# Patient Record
Sex: FEMALE | Race: WHITE | NOT HISPANIC OR LATINO | URBAN - METROPOLITAN AREA
[De-identification: names, ages, dates, MRNs, and addresses within clinical notes are randomized per-mention and may not be internally consistent; named-entity substitution may affect disease eponyms.]

---

## 2017-02-13 ENCOUNTER — OUTPATIENT (OUTPATIENT)
Dept: OUTPATIENT SERVICES | Facility: HOSPITAL | Age: 57
LOS: 1 days | End: 2017-02-13

## 2017-02-27 ENCOUNTER — INPATIENT (INPATIENT)
Facility: HOSPITAL | Age: 57
LOS: 2 days | Discharge: HOME HEALTH | End: 2017-03-02
Payer: OTHER MISCELLANEOUS

## 2017-02-27 PROCEDURE — 73560 X-RAY EXAM OF KNEE 1 OR 2: CPT | Mod: 26,LT

## 2017-02-28 ENCOUNTER — OUTPATIENT (OUTPATIENT)
Dept: OUTPATIENT SERVICES | Facility: HOSPITAL | Age: 57
LOS: 1 days | End: 2017-02-28

## 2017-03-01 ENCOUNTER — OUTPATIENT (OUTPATIENT)
Dept: OUTPATIENT SERVICES | Facility: HOSPITAL | Age: 57
LOS: 1 days | End: 2017-03-01

## 2017-03-02 ENCOUNTER — OUTPATIENT (OUTPATIENT)
Dept: OUTPATIENT SERVICES | Facility: HOSPITAL | Age: 57
LOS: 1 days | End: 2017-03-02

## 2023-11-24 ENCOUNTER — APPOINTMENT (OUTPATIENT)
Dept: ULTRASOUND IMAGING | Facility: CLINIC | Age: 63
End: 2023-11-24
Payer: COMMERCIAL

## 2023-11-24 ENCOUNTER — OUTPATIENT (OUTPATIENT)
Dept: OUTPATIENT SERVICES | Facility: HOSPITAL | Age: 63
LOS: 1 days | End: 2023-11-24
Payer: COMMERCIAL

## 2023-11-24 DIAGNOSIS — Z00.8 ENCOUNTER FOR OTHER GENERAL EXAMINATION: ICD-10-CM

## 2023-11-24 PROCEDURE — 76830 TRANSVAGINAL US NON-OB: CPT

## 2023-11-24 PROCEDURE — 76830 TRANSVAGINAL US NON-OB: CPT | Mod: 26

## 2023-11-24 PROCEDURE — 76856 US EXAM PELVIC COMPLETE: CPT

## 2023-11-24 PROCEDURE — 76856 US EXAM PELVIC COMPLETE: CPT | Mod: 26

## 2025-04-10 ENCOUNTER — OUTPATIENT (OUTPATIENT)
Dept: OUTPATIENT SERVICES | Facility: HOSPITAL | Age: 65
LOS: 1 days | Discharge: HOME | End: 2025-04-10
Payer: COMMERCIAL

## 2025-04-10 VITALS
HEART RATE: 77 BPM | TEMPERATURE: 98 F | HEIGHT: 67 IN | RESPIRATION RATE: 15 BRPM | SYSTOLIC BLOOD PRESSURE: 141 MMHG | WEIGHT: 235.89 LBS | OXYGEN SATURATION: 93 % | DIASTOLIC BLOOD PRESSURE: 85 MMHG

## 2025-04-10 DIAGNOSIS — Z96.652 PRESENCE OF LEFT ARTIFICIAL KNEE JOINT: Chronic | ICD-10-CM

## 2025-04-10 DIAGNOSIS — R19.4 CHANGE IN BOWEL HABIT: ICD-10-CM

## 2025-04-10 DIAGNOSIS — K52.9 NONINFECTIVE GASTROENTERITIS AND COLITIS, UNSPECIFIED: ICD-10-CM

## 2025-04-10 DIAGNOSIS — Z96.651 PRESENCE OF RIGHT ARTIFICIAL KNEE JOINT: Chronic | ICD-10-CM

## 2025-04-10 DIAGNOSIS — Z98.890 OTHER SPECIFIED POSTPROCEDURAL STATES: Chronic | ICD-10-CM

## 2025-04-10 PROCEDURE — 88312 SPECIAL STAINS GROUP 1: CPT

## 2025-04-10 PROCEDURE — 88313 SPECIAL STAINS GROUP 2: CPT | Mod: 26

## 2025-04-10 PROCEDURE — 88313 SPECIAL STAINS GROUP 2: CPT

## 2025-04-10 PROCEDURE — 88305 TISSUE EXAM BY PATHOLOGIST: CPT

## 2025-04-10 PROCEDURE — 88305 TISSUE EXAM BY PATHOLOGIST: CPT | Mod: 26

## 2025-04-10 PROCEDURE — 88312 SPECIAL STAINS GROUP 1: CPT | Mod: 26

## 2025-04-10 RX ORDER — HYDROCHLOROTHIAZIDE 50 MG/1
1 TABLET ORAL
Refills: 0 | DISCHARGE

## 2025-04-10 RX ORDER — DAPSONE 50 MG/G
1 GEL TOPICAL
Refills: 0 | DISCHARGE

## 2025-04-10 RX ORDER — FINASTERIDE 1 MG/1
1 TABLET, FILM COATED ORAL
Refills: 0 | DISCHARGE

## 2025-04-10 RX ORDER — MONTELUKAST SODIUM 10 MG/1
1 TABLET ORAL
Refills: 0 | DISCHARGE

## 2025-04-10 RX ORDER — LEVOCETIRIZINE DIHYDROCHLORIDE 5 MG/1
1 TABLET ORAL
Refills: 0 | DISCHARGE

## 2025-04-10 NOTE — ASU PATIENT PROFILE, ADULT - NSICDXPASTSURGICALHX_GEN_ALL_CORE_FT
PAST SURGICAL HISTORY:  History of surgery on arm b/l removal lumps on posterior arms    History of total left knee replacement (TKR)     History of total right knee replacement

## 2025-04-10 NOTE — ASU PATIENT PROFILE, ADULT - NSICDXPASTMEDICALHX_GEN_ALL_CORE_FT
PAST MEDICAL HISTORY:  Altered bowel function     Asthma     Chronic diarrhea     H/O nausea and vomiting     History of diverticulitis     NAFLD (nonalcoholic fatty liver disease)     Obesity

## 2025-04-10 NOTE — ASU PATIENT PROFILE, ADULT - TEACHING/LEARNING CULTURAL CONSIDERATIONS
Vita Clarke  1938 03/18/24    SUBJECTIVE:  sched for the Watchman procedure 4/4, has incr risk for falls and w recent injury to R tibia    Htn- bp sl high, she asked about cont amlodipine    DM- A1c still very good recently at 6.9.    Lab Results   Component Value Date    LABA1C 6.9 03/14/2024    LABA1C 6.7 (H) 02/16/2024    LABA1C 6.5 11/07/2023     Lab Results   Component Value Date    GLUF 128 (H) 10/14/2016    MALBCR 78.7 (H) 11/07/2023    LDLCALC 75 03/14/2024    CREATININE 0.9 02/19/2024     Headaches improved on neurontin and rf due.  Controlled substances monitoring: possible medication side effects, risk of tolerance and/or dependence, and alternative treatments discussed, no signs of potential drug abuse or diversion identified and [place] report reviewed today- activity consistent with treatment plan.      OBJECTIVE:    BP (!) 168/72 (Site: Left Upper Arm, Position: Sitting, Cuff Size: Large Adult)   Pulse 63   Wt 79.2 kg (174 lb 9.6 oz)   LMP  (LMP Unknown)   SpO2 97%   BMI 34.10 kg/m²     Physical Exam  Vitals reviewed.   Constitutional:       General: She is not in acute distress.     Appearance: She is well-developed.   HENT:      Head: Normocephalic and atraumatic.      Right Ear: External ear normal.      Left Ear: External ear normal.      Nose: Nose normal.      Mouth/Throat:      Pharynx: Oropharynx is clear. No oropharyngeal exudate.   Eyes:      General: No scleral icterus.        Right eye: No discharge.         Left eye: No discharge.      Conjunctiva/sclera: Conjunctivae normal.      Pupils: Pupils are equal, round, and reactive to light.   Neck:      Thyroid: No thyromegaly.      Vascular: No JVD.      Trachea: No tracheal deviation.   Cardiovascular:      Rate and Rhythm: Normal rate and regular rhythm.      Heart sounds: Normal heart sounds. No murmur heard.     No friction rub. No gallop.   Pulmonary:      Effort: Pulmonary effort is normal. No respiratory distress.      
none

## 2025-04-10 NOTE — ASU PATIENT PROFILE, ADULT - FALL HARM RISK - UNIVERSAL INTERVENTIONS
Bed in lowest position, wheels locked, appropriate side rails in place/Call bell, personal items and telephone in reach/Instruct patient to call for assistance before getting out of bed or chair/Non-slip footwear when patient is out of bed/[place] to call system/Physically safe environment - no spills, clutter or unnecessary equipment/Purposeful Proactive Rounding/Room/bathroom lighting operational, light cord in reach

## 2025-04-11 LAB — SURGICAL PATHOLOGY STUDY: SIGNIFICANT CHANGE UP

## 2025-04-14 DIAGNOSIS — K44.9 DIAPHRAGMATIC HERNIA WITHOUT OBSTRUCTION OR GANGRENE: ICD-10-CM

## 2025-04-14 DIAGNOSIS — K20.90 ESOPHAGITIS, UNSPECIFIED WITHOUT BLEEDING: ICD-10-CM

## 2025-04-14 DIAGNOSIS — J45.909 UNSPECIFIED ASTHMA, UNCOMPLICATED: ICD-10-CM

## 2025-04-14 DIAGNOSIS — K52.9 NONINFECTIVE GASTROENTERITIS AND COLITIS, UNSPECIFIED: ICD-10-CM

## 2025-04-14 DIAGNOSIS — K63.89 OTHER SPECIFIED DISEASES OF INTESTINE: ICD-10-CM

## 2025-04-14 DIAGNOSIS — Z98.0 INTESTINAL BYPASS AND ANASTOMOSIS STATUS: ICD-10-CM

## 2025-04-14 DIAGNOSIS — R19.7 DIARRHEA, UNSPECIFIED: ICD-10-CM

## 2025-04-14 DIAGNOSIS — R11.2 NAUSEA WITH VOMITING, UNSPECIFIED: ICD-10-CM

## 2025-04-14 DIAGNOSIS — K29.50 UNSPECIFIED CHRONIC GASTRITIS WITHOUT BLEEDING: ICD-10-CM

## 2025-04-14 DIAGNOSIS — K57.30 DIVERTICULOSIS OF LARGE INTESTINE WITHOUT PERFORATION OR ABSCESS WITHOUT BLEEDING: ICD-10-CM

## 2025-05-28 NOTE — ASU PATIENT PROFILE, ADULT - FALL HARM RISK - TYPE OF ASSESSMENT
Patient : Karissa Hill Age: 75 year old Sex: female   MRN: [id number] Encounter Date: 5/28/2025    History     Chief Complaint   Patient presents with    Shortness of Breath    Chest Pain           History of Present Illness  The patient presents for evaluation of chest pain, nausea, and bradycardia.    She was scheduled for a cardiac scan today, as ordered by Dr. Cho due to an abnormal EKG. Upon arrival, she experienced significant weakness and vomiting. She reports intermittent chest pain, which has since subsided. She took diltiazem 120 mg last night and this morning at 7:15, with the onset of vomiting occurring around 11:30. She does not typically take diltiazem, it was only for this test. She is currently under the care of Dr. Cho for cardiomyopathy, diagnosed in 2004, and has not undergone heart catheterization. A previous stress test was attempted but could not be completed due to hip issues. She does have a history of high cholesterol, but did not tolerate any statins due to muscle weakness. She has a past history of smoking, having quit approximately 40 years ago after a 20-year habit. She takes losartan at night for hypertension and carvedilol twice daily. She also takes baby aspirin at night. She does not have a history of blood clots and reports that today's chest pain is a new symptom. She recalls undergoing a heart scan in [place] several years ago.     Supplemental Information  She has been diabetic since 2009 and continues to take glipizide.    SOCIAL HISTORY  The patient smoked for about 20 years and quit almost 40 years ago.    Chart Review: I reviewed the patient's medications, allergies, and past medical and surgical history in Epic. External charts also reviewed.     DATA REVIEWED:  5/12/25 Echo:   * Mildly increased left ventricular chamber size.    * Mildly decreased left ventricular systolic function, EF 43 %, GLS -12.7 %.    * Moderately increased left ventricular diastolic  filling pressure.    * Normal right ventricular size and systolic function.    * Aortic valve sclerosis without stenosis.    * Trivial-mild aortic valve regurgitation.    * Moderate mitral valve regurgitation.    * Moderately increased left atrial chamber size.    * No pericardial effusion.    * Previous LVEF  49%, GLS -14%.    7/24/24 US ABIs Bilateral  IMPRESSION:    1. Resting ankle-brachial indices of 1.15 on the right and 1.1 on the left.  2. Resting Toe-brachial indices of 0.88 on the right and 1.02 on the left.     6/29/24 US LE Duplex Left  IMPRESSION:  No sonographic evidence of left lower extremity DVT.     6/5/24 NM Cardiac Amyloidosis  IMPRESSION: No convincing evidence of cardiac transthyretin amyloidosis  (ATTR).     [facility] practice points guidelines:     H/CL of 1-1.5 or visual score 1 is considered equivocal for TTR  amyloidosis.     Evaluation for AL amyloidosis by serum free light chains, serum, and urine  immunofixation is recommended in all patients undergoing 99mTc-PYP scans  for cardiac amyloidosis.      A negative or mildly positive exam does not exclude AL amyloid or early TTR  amyloid.     Limited echo 04/22/2024  Abnormal septal motion consistent with conduction abnormality.  Mildly decreased left ventricular systolic function.  Left ventricular ejection fraction; 49 %, unchanged from previous study.  Abnormal LV Global longitudinal strain -14.0 % with apical sparing (consider amyloid).  Incidental findings include mild MR and AR.     Limited echo 11/01/2023  Abnormal septal motion consistent with conduction abnormality.  Mildly decreased left ventricular systolic function.  Left ventricular ejection fraction; 49 %, last measured at 53%.  Abnormal LV Global longitudinal strain -14.0%.     Holter monitor 10/25/2023  Patient monitored for 2d, analyzable time was 2d starting on 10/25/2023 12:17 pm.  Primary rhythm was Sinus Rhythm. Average heart rate was 88 bpm, Minimum heart rate was 72 bpm on  Day 2 / 02:13:17 am, Max heart rate was 108 bpm on Day 3 / 01:51:12 am  PVC(s): [place] was 1.61 %, 4090 total PVC(s), 4 disparate morphologies  Ventricular Tachycardia: 33 events, longest event 3 beats at Day 2 / 11:29:17 pm, fastest event 171 bpm at Day 2 / 12:58:20 pm     Echo 12/07/2022  Normal left ventricular chamber size.  Low normal left ventricular systolic function with ejection fraction of 53 %.  Grade I left ventricular diastolic dysfunction.  Mildly increased left ventricular wall thickness.  Abnormal septal motion consistent with conduction abnormality.  RVSP could not be calculated due to incomplete tricuspid regurgitation velocity profile.  Normal right ventricular chamber size.  Normal right ventricular systolic function.  Mild mitral valve regurgitation.  compared top rior study LVEF has improved from 42% to 53%.     NM stress test 07/20/2022  Abnormal stress myocardial perfusion imaging.  There is a moderate size mild intensity fixed defect in the anteroseptal territory.   LVEF is abnormal at 38% with a global reduction of LV systolic function.      Past/Family/Social History     Allergies   Allergen Reactions    [place] Other (See Comments)    Metformin DIARRHEA    Methylprednisolone RASH     Notes a rash on legs after a hip injection    Simvastatin      Muscle aches        No current facility-administered medications for this encounter.     Current Outpatient Medications   Medication Sig    dilTIAZem (CARDIZEM) 120 MG tablet Take one tablet by mouth the morning of scan, 4 -6 hours before scan.    Semaglutide, 2 MG/DOSE, (Ozempic, 2 MG/DOSE,) 8 MG/3ML Solution Pen-injector Inject 2 mg into the skin every 7 days. Indications: Type 2 Diabetes    triamcinolone (ARISTOCORT) 0.1 % cream Apply 1 Application topically in the morning and 1 Application in the evening.    gabapentin (NEURONTIN) 300 MG capsule Take 1 capsule by mouth nightly as needed (leg pain).    losartan (COZAAR) 100 MG tablet Take 1  tablet by mouth daily.    carvedilol (COREG) 25 MG tablet Take 1 tablet by mouth in the morning and 1 tablet in the evening. Take with meals.    dapagliflozin (Farxiga) 10 MG tablet Take 1 tablet by mouth daily.    levothyroxine 50 MCG tablet Take 1 tablet by mouth daily.    glipiZIDE (GLUCOTROL) 10 MG tablet 20mg am and 10 mg pm before meals    blood glucose meter Test blood sugar 1 times daily as directed. Diagnosis: E11.69. Meter: Insurance preference    blood glucose lancets Test blood sugar 1 times daily as directed. Diagnosis: E11.69. Meter: Insurance preference    blood glucose (Pharmacist Choice Autocode) test strip Test blood sugar 1 times daily as directed. Diagnosis: E11.69. Meter: Insurance preference    meloxicam (MOBIC) 15 MG tablet TAKE 1 TABLET EVERY DAY AS NEEDED FOR PAIN    MAGNESIUM PO Take 1 tablet by mouth 3 days a week.    Multiple Vitamin (MULTIVITAMIN ADULT PO) Take by mouth daily.    Blood Glucose Calibration (ACCU-CHEK DANIEL) Solution Use to calibrate as directed    Cholecalciferol (VITAMIN D) 2000 units tablet Take 2,000 Units by mouth daily.    aspirin 81 MG tablet Take 81 mg by mouth daily.    fluticasone (FLONASE) 50 MCG/ACT nasal spray Spray 2 sprays in each nostril 2 times daily as needed.        Past Medical History:   Diagnosis Date    Acquired hypothyroidism 04/21/2020    Allergy     Arthritis     Benign essential hypertension     Cardiomyopathy (CMD)     Cataract     Clotting disorder  (CMD)     Congestive cardiac failure  (CMD) 12/2021    Diabetes mellitus, type II  (CMD)     Fibromyalgia     Herpes zoster     HTN (hypertension)     Myalgia due to statin 04/13/2021    Simvastatin    Thyroid nodule 03/19/2019    Type 2 diabetes mellitus with complication, without long-term current use of insulin  (CMD) 12/31/2009    Vitamin D insufficiency 03/19/2019       Past Surgical History:   Procedure Laterality Date    Breast biopsy Left 01/01/1986    Dexa bone density axial skeleton   2004    Occult blood test tube  2013    Oophorectomy      Pap smear,routine  2013    Removal gallbladder      Spinal fusion      C4&5    Total abdominal hysterectomy  1995       Family History   Problem Relation Age of Onset    Hypertension Mother     Stroke Mother     Hyperlipidemia Mother     Cancer, Prostate Father 75    Hypertension Father     Stroke Father     Hyperlipidemia Sister     Patient is unaware of any medical problems Sister     Patient is unaware of any medical problems Sister     Cancer Brother         brain    Diabetes Brother     Cancer Brother         Brain tumor    Arthritis Father     High blood pressure Father     High blood pressure Mother     High cholesterol Mother     High cholesterol Sister        Social History     Tobacco Use    Smoking status: Former     Current packs/day: 0.00     Average packs/day: 0.5 packs/day for 15.0 years (7.5 ttl pk-yrs)     Types: Cigarettes     Quit date: 1991     Years since quittin.4     Passive exposure: Never    Smokeless tobacco: Never   Vaping Use    Vaping status: never used   Substance Use Topics    Alcohol use: Yes     Alcohol/week: 5.0 standard drinks of alcohol     Types: 5 Glasses of wine per week     Comment: glass of wine daily     Drug use: No          Review of Systems   Review of Symptoms     See HPI.     Physical Exam     Physical Exam     ED Triage Vitals   ED Triage Vitals Group      Temp 25 1340 96.3 °F (35.7 °C)      Heart Rate 25 1338 (!) 40      Resp 25 1338 20      BP 25 1338 134/62      SpO2 25 1338 98 %      EtCO2 mmHg --       Height --       Weight 25 1338 153 lb (69.4 kg)      Weight Scale Used 25 1338 Scale in bed      BMI (Calculated) --       IBW/kg (Calculated) --        Physical Exam  Lungs were auscultated.    Physical Exam  Vitals and nursing note reviewed.   Constitutional:       General: She is not in acute distress.     Appearance: Normal  appearance. She is not ill-appearing, toxic-appearing or diaphoretic.   HENT:      Head: Normocephalic and atraumatic.      Mouth/Throat:      Mouth: Mucous membranes are moist.   Eyes:      Conjunctiva/sclera: Conjunctivae normal.      Pupils: Pupils are equal, round, and reactive to light.   Cardiovascular:      Rate and Rhythm: Bradycardia present. Rhythm irregular.      Pulses:           Radial pulses are 2+ on the right side and 2+ on the left side.        Dorsalis pedis pulses are 2+ on the right side and 2+ on the left side.        Posterior tibial pulses are 2+ on the right side and 2+ on the left side.   Pulmonary:      Effort: Pulmonary effort is normal. No respiratory distress.      Breath sounds: Normal breath sounds. No stridor. No wheezing or rhonchi.   Abdominal:      General: Bowel sounds are normal.      Palpations: Abdomen is soft.      Tenderness: There is no abdominal tenderness. There is no right CVA tenderness, left CVA tenderness or guarding.   Musculoskeletal:         General: Normal range of motion.      Right lower leg: No edema.      Left lower leg: No edema.   Skin:     General: Skin is warm.      Capillary Refill: Capillary refill takes less than 2 seconds.   Neurological:      General: No focal deficit present.      Mental Status: She is alert and oriented to person, place, and time.   Psychiatric:         Mood and Affect: Mood normal.         Behavior: Behavior normal.         Thought Content: Thought content normal.         Judgment: Judgment normal.                Procedures   ED Procedures     Procedures       Lab Results     ED Lab     Results for orders placed or performed during the hospital encounter of 05/28/25   Comprehensive Metabolic Panel    Specimen: Blood, Venous   Result Value Ref Range    Fasting Status      Sodium 135 135 - 145 mmol/L    Potassium 4.8 3.4 - 5.1 mmol/L    Chloride 99 97 - 110 mmol/L    Carbon Dioxide 24 21 - 32 mmol/L    Anion Gap 17 7 - 19 mmol/L     Glucose 336 (H) 70 - 99 mg/dL    BUN 20 6 - 20 mg/dL    Creatinine 0.90 0.51 - 0.95 mg/dL    Glomerular Filtration Rate 67 >=60    BUN/Cr 22 7 - 25    Calcium 10.0 8.4 - 10.2 mg/dL    Bilirubin, Total 1.2 (H) 0.2 - 1.0 mg/dL    GOT/AST 83 (H) <=37 Units/L    GPT/ALT 94 (H) <64 Units/L    Alkaline Phosphatase 79 45 - 117 Units/L    Albumin 3.5 3.4 - 5.0 g/dL    Protein, Total 6.9 6.4 - 8.2 g/dL    Globulin 3.4 2.0 - 4.0 g/dL    A/G Ratio 1.0 1.0 - 2.4   TROPONIN I, HIGH SENSITIVITY    Specimen: Blood, Venous   Result Value Ref Range    Troponin I, High Sensitivity 55 (HH) <52 ng/L   Magnesium    Specimen: Blood, Venous   Result Value Ref Range    Magnesium 2.4 1.7 - 2.4 mg/dL   CBC with Automated Differential (performable only)    Specimen: Blood, Venous   Result Value Ref Range    WBC 9.5 4.2 - 11.0 K/mcL    RBC 4.79 4.00 - 5.20 mil/mcL    HGB 15.4 12.0 - 15.5 g/dL    HCT 44.3 36.0 - 46.5 %    MCV 92.5 78.0 - 100.0 fl    MCH 32.2 26.0 - 34.0 pg    MCHC 34.8 32.0 - 36.5 g/dL    RDW-CV 13.4 11.0 - 15.0 %    RDW-SD 45.5 39.0 - 50.0 fL     140 - 450 K/mcL    NRBC 0 <=0 /100 WBC    Neutrophil, Percent 76 %    Lymphocytes, Percent 19 %    Mono, Percent 3 %    Eosinophils, Percent 1 %    Basophils, Percent 1 %    Immature Granulocytes 0 %    Absolute Neutrophils 7.2 1.8 - 7.7 K/mcL    Absolute Lymphocytes 1.8 1.0 - 4.0 K/mcL    Absolute Monocytes 0.3 0.3 - 0.9 K/mcL    Absolute Eosinophils  0.1 0.0 - 0.5 K/mcL    Absolute Basophils 0.1 0.0 - 0.3 K/mcL    Absolute Immature Granulocytes 0.0 0.0 - 0.2 K/mcL   TROPONIN I, HIGH SENSITIVITY    Specimen: Blood, Venous   Result Value Ref Range    Troponin I, High Sensitivity 53 (HH) <52 ng/L          EKG     EKG Interpretation -  Time: 1334  Rate: 48 bpm  Rhythm: sinus bradycardia   Abnormality: yes. Rate significantly reduced when compared to previous EKG on 5/19/25. No obvious p waves. Ischemia difficult to interpret due to slowed rate. There was some apparent changes on  inferior leads on EKG done on 5/19/25.     EKG independently interpreted by the emergency department physician Dr. Zamudio.     EKG Interpretation - Time: 1540  Rate: 53 bpm  Rhythm: sinus bradycardia   Abnormality: p waves are now more noticeable. EKG appears more similar to EKG done on 5/19/25.     EKG tracing interpreted by ED physician Dr. Cooper.           Radiology Results     ED Radiology Results     Imaging Results              XR CHEST AP OR PA (Final result)  Result time 05/28/25 14:58:32      Final result                   Impression:    IMPRESSION:  No acute process    Electronically Signed by: Ely Hemphill MD  Signed on: 5/28/2025 2:58 PM  Created on Workstation ID: [id number]  Signed on Workstation ID: [id number]               Narrative:    EXAMINATION: XR CHEST AP OR PA    COMPARISON: None    CLINICAL INDICATIONS: chest pain    FINDINGS:  The cardiomediastinal silhouette and pulmonary vessels are normal. There is  a transcutaneous pacer projecting over the left upper lung and a catheter  or wire projects over the left base. Lead EEG leads are also present.    The lungs and pleural spaces are clear, and there is no pneumothorax.                                         ED Medications     ED Medications     ED Medication Orders (From admission, onward)      Ordered Start     Status Ordering Provider    05/28/25 1349 05/28/25 1350  aspirin chewable 324 mg  ONCE         Last MAR action: Given BRYCE COELHO               ED Course     Vitals:    05/28/25 1550 05/28/25 1556 05/28/25 1633 05/28/25 1704   BP: (!) 154/68 (!) 158/71 (!) 171/71 (!) 167/72   BP Location:       Patient Position:       Pulse: (!) 52 (!) 50 (!) 54 (!) 59   Resp: 17 15 18 18   Temp:       TempSrc:       SpO2: 97% 98% 97% 98%   Weight:           ED Course as of 05/31/25 1601   Wed May 28, 2025   1412 Dr. Zamudio spoke with cardiology Dr. Oviedo. Recommends  [[facility]]   1419 Rhythm strip printed. Dr. Murillo reviewed. Recommends proceeding with  CTA. Called and spoke with CT. They are unable to complete the outpatient scan while patient is in ED or even if they were to be admitted inpatient. Patient needs to reschedule.  [[facility]]   1522 Patient currently resting comfortably. Asymptomatic at this time.  [[facility]]   1652 Spoke with Dr. Murillo from cardiology. Patient stable for discharge and outpatient follow up with cardiology. Likely needs and outpatient cath. Patient with no active chest pain, shortness of breath, nausea, diaphoresis. Heart rates improved into the 60's. Episode today likely secondary to diltiazem doses for CTA test.  [[facility]]      ED Course User Index  [[facility]] Kesha Stanford, APNP       Results  Testing  EKG shows some irregularity and changes.          Consults                        MDM     Amount and/or Complexity of Data Reviewed  Clinical lab tests: reviewed  Tests in the radiology section of CPT®: reviewed  Decide to obtain previous medical records or to obtain history from someone other than the patient: yes                    Assessment & Plan  Initial Assessment: Patient presents with new chest pain, nausea, vomiting, and bradycardia that started today around 11 am. She has a history of cardiomyopathy and was administered diltiazem to lower her heart rate for a heart scan.     Patient is well-appearing and does not appear to be in acute distress.  Initial vitals with a heart rate of 40, the rest are within normal limits.  Heart rhythm on the cardiac monitor difficult to differentiate between A-fib with slow ventricular response versus pauses/block.  Patient did take 2 doses of diltiazem over the past 12 hours in preparation for a CTA of her coronary arteries today.  Patient unable to have this completed due to sudden onset of chest pain, nausea, and vomiting.  At present she currently denies any chest pain.  Heart rhythm is regular, but slow.  Breathing is not distressed or labored, lungs are clear bilaterally.  BCS 15.  She is alert and  oriented x 4.    Extensively reviewed her past cardiac history and notes.  She has a history of cardiomyopathy that was diagnosed around 2004.  She has had yearly echocardiograms since that time.  Her two most recent echocardiograms have had a decrease in her EF.  In April 2024 her EF was 49%, May 2025 EF 43%.  She reports that she has had shortness of breath for a long period of time and this is not new.  Typically when walking any distance it worsens.  She denies any associated symptoms with the shortness of breath such as chest pain, diaphoresis, lightheadedness, syncope.  Today was the first time she has had chest pain and episode lasted approximately 5 minutes while she was vomiting.  She did have a nuc med stress test in 2022 that was abnormal.  Was scheduled to have an outpatient CT coronary artery study but this was not completed due to issues with insurance approving medication.  She followed up with cardiology and reports that they decided against doing any further testing at that time.  She has never had a heart cath.  CTA coronary artery study was recently ordered by Dr. Cho due to worsening EF.    On patient's arrival to the ER, my attending Dr. Zamudio spoke with cardiologist on-call Dr. Murillo.  He reviewed EKGs and recommended proceeding with CT coronary artery study and serial troponins.    Initial troponin slightly elevated at 55, repeat 2-hour troponin down to 53.  Repeat EKG at 1540 shows improvement in her heart rate in the high 50s low 60s.  Rhythm is sinus bradycardia.  EKG appears similar to the one done on 5/19/2025.  Chest x-ray with no acute findings.  All of her other labs are nonacute.  Patient asymptomatic throughout ER visit.  We did speak with CT and they got back to us and stated that they were unable to complete the CTA coronary artery study due to patient being in the ER.    Case discussed with Dr. Murillo who thinks episode today likely related to the 2 doses of diltiazem.  I  would agree with this.  He does not feel that patient warrants admission at this time.  Patient with no active chest pain. Recommendation would be to follow-up with cardiology outpatient and likely have cardiac catheterization due to failed attempt at CT coronary artery study.    Patient updated with results and plan of care.  She feels comfortable with the plan to discharge home at this time. She is ambulatory with an oxygen above 95% on room air. No respiratory distress.  Blood pressure has remained stable throughout ER visit.  Heart rate continues to improve.  Strict return precautions were given.  Patient was in agreement with plan of care and had no further questions or concerns at time of discharge.      APC-Physician Collaboration Statement: Primary care of this patient was initiated by the below signing APC. Consistent with state regulations, as well as hospital and system bylaws, this patient was discussed with and physically seen by a collaborating physician. An Emergency Department Physician was available for patient care coordination during the time of this encounter.         Critical Care         Disposition       Clinical Impression and Diagnosis  5:23 PM       ED Diagnoses       Diagnosis Comment Associated Orders       Final diagnoses    Bradycardia -- SERVICE TO CARDIOLOGY      Side effect of medication -- SERVICE TO CARDIOLOGY      Chest pain, unspecified type -- SERVICE TO CARDIOLOGY              Follow Up:  No follow-up provider specified.        Summary of your Discharge Medications      You have not been prescribed any medications.         Pt is discharged to home/self care in stable condition.                    Discharge 5/28/2025  4:54 PM  Karissa Hill discharge to home/self care.                     Kesha Stanford APNP  05/31/25 1609     Admission

## 2025-06-12 NOTE — ASU PREOP CHECKLIST - VIA
stretcher